# Patient Record
Sex: FEMALE | Race: WHITE
[De-identification: names, ages, dates, MRNs, and addresses within clinical notes are randomized per-mention and may not be internally consistent; named-entity substitution may affect disease eponyms.]

---

## 2019-07-19 ENCOUNTER — HOSPITAL ENCOUNTER (EMERGENCY)
Dept: HOSPITAL 25 - ED | Age: 34
LOS: 1 days | Discharge: HOME | End: 2019-07-20
Payer: COMMERCIAL

## 2019-07-19 DIAGNOSIS — Z88.5: ICD-10-CM

## 2019-07-19 DIAGNOSIS — R00.2: Primary | ICD-10-CM

## 2019-07-19 DIAGNOSIS — Z88.0: ICD-10-CM

## 2019-07-19 DIAGNOSIS — Z88.8: ICD-10-CM

## 2019-07-19 PROCEDURE — 93005 ELECTROCARDIOGRAM TRACING: CPT

## 2019-07-19 PROCEDURE — 85025 COMPLETE CBC W/AUTO DIFF WBC: CPT

## 2019-07-19 PROCEDURE — 84443 ASSAY THYROID STIM HORMONE: CPT

## 2019-07-19 PROCEDURE — 36415 COLL VENOUS BLD VENIPUNCTURE: CPT

## 2019-07-19 PROCEDURE — 80053 COMPREHEN METABOLIC PANEL: CPT

## 2019-07-19 PROCEDURE — 84484 ASSAY OF TROPONIN QUANT: CPT

## 2019-07-19 PROCEDURE — 99282 EMERGENCY DEPT VISIT SF MDM: CPT

## 2019-07-20 VITALS — SYSTOLIC BLOOD PRESSURE: 141 MMHG | DIASTOLIC BLOOD PRESSURE: 95 MMHG

## 2019-07-20 LAB
ALBUMIN SERPL BCG-MCNC: 4.8 G/DL (ref 3.2–5.2)
ALBUMIN/GLOB SERPL: 1.7 {RATIO} (ref 1–3)
ALP SERPL-CCNC: 56 U/L (ref 34–104)
ALT SERPL W P-5'-P-CCNC: 10 U/L (ref 7–52)
ANION GAP SERPL CALC-SCNC: 10 MMOL/L (ref 2–11)
AST SERPL-CCNC: 15 U/L (ref 13–39)
BASOPHILS # BLD AUTO: 0 10^3/UL (ref 0–0.2)
BUN SERPL-MCNC: 14 MG/DL (ref 6–24)
BUN/CREAT SERPL: 25 (ref 8–20)
CALCIUM SERPL-MCNC: 9.7 MG/DL (ref 8.6–10.3)
CHLORIDE SERPL-SCNC: 107 MMOL/L (ref 101–111)
EOSINOPHIL # BLD AUTO: 0 10^3/UL (ref 0–0.6)
GLOBULIN SER CALC-MCNC: 2.9 G/DL (ref 2–4)
GLUCOSE SERPL-MCNC: 105 MG/DL (ref 70–100)
HCO3 SERPL-SCNC: 21 MMOL/L (ref 22–32)
HCT VFR BLD AUTO: 38 % (ref 35–47)
HGB BLD-MCNC: 12.6 G/DL (ref 12–16)
LYMPHOCYTES # BLD AUTO: 1.9 10^3/UL (ref 1–4.8)
MCH RBC QN AUTO: 29 PG (ref 27–31)
MCHC RBC AUTO-ENTMCNC: 33 G/DL (ref 31–36)
MCV RBC AUTO: 88 FL (ref 80–97)
MONOCYTES # BLD AUTO: 0.6 10^3/UL (ref 0–0.8)
NEUTROPHILS # BLD AUTO: 9.5 10^3/UL (ref 1.5–7.7)
NRBC # BLD AUTO: 0 10^3/UL
NRBC BLD QL AUTO: 0
PLATELET # BLD AUTO: 270 10^3/UL (ref 150–450)
POTASSIUM SERPL-SCNC: 3.8 MMOL/L (ref 3.5–5)
PROT SERPL-MCNC: 7.7 G/DL (ref 6.4–8.9)
RBC # BLD AUTO: 4.32 10^6 /UL (ref 3.7–4.87)
SODIUM SERPL-SCNC: 138 MMOL/L (ref 135–145)
TROPONIN I SERPL-MCNC: 0 NG/ML (ref ?–0.04)
TSH SERPL-ACNC: 2.91 MCIU/ML (ref 0.34–5.6)
WBC # BLD AUTO: 12.1 10^3/UL (ref 3.5–10.8)

## 2019-07-20 NOTE — ED
Dizziness





- HPI Summary


HPI Summary: 


Patient is a 35 y/o F presenting to ED with complaints of light-headedness and 

palpitations. She states that she became light-headed around 2230 7/19/19. This 

resolved, and patient got ready to go to bed. While she was lying down, she  

had another episode of lightheadedness and had palpations onset. Palpitations 

are described as "pounding". Episode lasted 2 minutes. She notes a similar 

episode in February 2019, stating Sx at the time had lasted 30 minutes. She 

went to ED, but by the time she arrived Sx had resolved. Patient followed up 

with cardiology, patient was given [employer] kate to catch palpitations. She 

recorded today's episodes. On triage, pain is denied, nothing is noted to 

aggravate/alleviate Sx. Home medications and allergies are reviewed. 





- History Of Current Complaint


Chief Complaint: EDDysrhythmPalp


Stated Complaint: I THINK MY HEART WENT INTO AFIB PER PT


Hx Obtained From: Patient


Onset/Duration: Resolved


Timing: Intermittent Episode Lasting - 2 minutes


Severity Currently: None


Character: Dizzy


Aggravating Factor(s): Nothing


Alleviating Factor(s): Nothing


Associated Signs And Symptoms: Positive: Palpitations





- Allergies/Home Medications


Allergies/Adverse Reactions: 


 Allergies











Allergy/AdvReac Type Severity Reaction Status Date / Time


 


bisoprolol Allergy  Numbness Verified 07/19/19 23:07





   And  





   Tingling  


 


codeine Allergy  Numbness Verified 07/19/19 23:07


 


Penicillins Allergy  Anaphylatic Verified 07/19/19 23:07





   Shock  














PMH/Surg Hx/FS Hx/Imm Hx


Sensory History: 


   Denies: Hx Legally Blind, Hx Deafness


Opthamlomology History: 


   Denies: Hx Legally Blind


EENT History: 


   Denies: Hx Deafness


Infectious Disease History: No


Infectious Disease History: 


   Denies: Traveled Outside the [place] in Last 30 Days





- Family History


Known Family History: 


   Negative: Diabetes





- Social History


Alcohol Use: None


Substance Use Type: Reports: None


Smoking Status (MU): Never Smoked Tobacco





Review of Systems


Positive: Palpitations


Neurological: Other - positive - dizziness 


All Other Systems Reviewed And Are Negative: Yes





Physical Exam





- Summary


Physical Exam Summary: 


VITAL SIGNS: Reviewed.


GENERAL:  Patient is a well-developed and nourished female who is lying 

comfortable in the stretcher. Patient is not in any acute respiratory distress.


HEAD AND FACE: No signs of trauma. No ecchymosis, hematomas or skull 

depressions. No sinus tenderness.


EYES: PERRLA, EOMI x 2, No injected conjunctiva, no nystagmus.


EARS: Hearing grossly intact. Ear canals and tympanic membranes are within 

normal limits.


MOUTH: Oropharynx within normal limits.


NECK: Supple, trachea is midline, no adenopathy, no JVD, no carotid bruit, no c-

spine tenderness, neck with full ROM


CHEST: Symmetric, no tenderness at palpation


LUNGS: Clear to auscultation bilaterally. No wheezing or crackles.


CVS: Regular rate and rhythm, S1 and S2 present, no murmurs or gallops 

appreciated.


ABDOMEN: Soft, non-tender. No signs of distention. No rebound no guarding, and 

no masses palpated. Bowel sounds are normal.


EXTREMITIES: FROM in all major joints, no edema, no cyanosis or clubbing.


NEURO: Alert and oriented x 3. No acute neurological deficits. Speech is normal 

and follows commands.


SKIN: Dry and warm








Triage Information Reviewed: Yes


Vital Signs On Initial Exam: 


 Initial Vitals











Temp Pulse Resp BP Pulse Ox


 


 98.4 F   104   18   138/105   100 


 


 07/19/19 23:05  07/19/19 23:05  07/19/19 23:05  07/19/19 23:05  07/19/19 23:05











Vital Signs Reviewed: Yes





Diagnostics





- Vital Signs


 Vital Signs











  Temp Pulse Resp BP Pulse Ox


 


 07/20/19 03:36  97.7 F  70  18  160/87  99


 


 07/20/19 01:48  98.9 F  83  18  135/94  99


 


 07/19/19 23:05  98.4 F  104  18  138/105  100














- Laboratory


Lab Results: 


 Lab Results











  07/20/19 Range/Units





  04:32 


 


WBC  12.1 H  (3.5-10.8)  10^3/uL


 


RBC  4.32  (3.70-4.87)  10^6 /uL


 


Hgb  12.6  (12.0-16.0)  g/dL


 


Hct  38  (35-47)  %


 


MCV  88  (80-97)  fL


 


MCH  29  (27-31)  pg


 


MCHC  33  (31-36)  g/dL


 


RDW  14  (10-15)  %


 


Plt Count  270  (150-450)  10^3/uL


 


MPV  8.2  (7.4-10.4)  fL


 


Neut % (Auto)  78.5  %


 


Lymph % (Auto)  15.9  %


 


Mono % (Auto)  5.0  %


 


Eos % (Auto)  0.4  %


 


Baso % (Auto)  0.2  %


 


Absolute Neuts (auto)  9.5 H  (1.5-7.7)  10^3/ul


 


Absolute Lymphs (auto)  1.9  (1.0-4.8)  10^3/ul


 


Absolute Monos (auto)  0.6  (0-0.8)  10^3/ul


 


Absolute Eos (auto)  0.0  (0-0.6)  10^3/ul


 


Absolute Basos (auto)  0.0  (0-0.2)  10^3/ul


 


Absolute Nucleated RBC  0.0  10^3/ul


 


Nucleated RBC %  0.0  











Result Diagrams: 


 07/20/19 04:32





 07/20/19 04:32


Lab Statement: Any lab studies that have been ordered have been reviewed, and 

results considered in the medical decision making process.





- EKG


  ** 2312


Cardiac Rate: NL - rate of 92 BPM


EKG Rhythm: Sinus Rhythm


Summary of EKG Findings: EKG showed sinus rhythm with rate of 92 BPM, Non-

specific T wave changes in inferior leads.





  ** No standard instances


Summary of EKG Findings: EKG showed sinus rhythm with rate of 92 BPM, Non-

specific T wave changes in inferior leads.





Dizzy Course/Dx





- Course


Course Of Treatment: Patient is a 35 y/o F presenting to ED with complaints of 

light-headedness and palpitations. She states that she became light-headed 

around 2230 7/19/19. This resolved, and patient got ready to go to bed. While 

she was lying down, she  had another episode of lightheadedness and had 

palpations onset. Palpitations are described as "pounding". Episode lasted 2 

minutes. She notes a similar episode in February 2019. Physical exam is 

unremarkable. EKG showed sinus rhythm with rate of 92 BPM, Non-specific T wave 

changes in inferior leads. Labs showed WBC 12.1, absolute neuts 9.5, carbon 

dioxide 21, BUN/creatinine ratio 25, glucose 105, trop 0, TSH 2.91. Patient was 

discharged to home with instructions to follow up with her cardiologist and PCP.





- Diagnoses


Provider Diagnoses: 


 Palpitation








Discharge





- Sign-Out/Discharge


Documenting (check all that apply): Patient Departure - discharge


Patient Received Moderate/Deep Sedation with Procedure: No





- Discharge Plan


Condition: Stable


Disposition: HOME


Patient Education Materials:  Heart Palpitations (ED)


Referrals: 


[facility] [Outside] - 3 Days


Additional Instructions: 


RETURN TO ED FOR ANY NEW OR WORSENING SYMPTOMS. FOLLOW UP WITH YOUR PRIMARY 

CARE PHYSICIAN AND YOUR CARDIOLOGIST WITHIN THREE DAYS.





- Attestation Statements


Document Initiated by Scribe: Yes


Documenting Scribe: LUI CONROY


Provider For Whom Scribe is Documenting (Include Credential): SEKOU GROVE MD


Scribe Attestation: 


LUI FITZGERALD, scribed for SEKOU GROVE MD on 07/20/19 at 0814. 


Status of Scribe Document: Ready